# Patient Record
Sex: FEMALE | URBAN - METROPOLITAN AREA
[De-identification: names, ages, dates, MRNs, and addresses within clinical notes are randomized per-mention and may not be internally consistent; named-entity substitution may affect disease eponyms.]

---

## 2021-11-04 ENCOUNTER — OFFICE VISIT (OUTPATIENT)
Dept: URBAN - METROPOLITAN AREA CLINIC 91 | Facility: CLINIC | Age: 36
End: 2021-11-04

## 2021-11-04 DIAGNOSIS — H04.123 DRY EYE SYNDROME OF BILATERAL LACRIMAL GLANDS: ICD-10-CM

## 2021-11-04 DIAGNOSIS — H52.223 REGULAR ASTIGMATISM, BILATERAL: Primary | ICD-10-CM

## 2021-11-04 PROCEDURE — 92025 CPTRIZED CORNEAL TOPOGRAPHY: CPT | Performed by: SPECIALIST

## 2021-11-04 PROCEDURE — 99024 POSTOP FOLLOW-UP VISIT: CPT | Performed by: SPECIALIST

## 2021-11-04 NOTE — IMPRESSION/PLAN
Called left message to reschedule appointment   Impression: Regular astigmatism, bilateral: H52.223. Plan: Pentacam reviewed and explained stable OU. Encouraged patient to not wear CTL 1 "[employer] prior to LASIK EVAL. Discussed diagnosis in detail with patient. LASIK candidate. Patient elects to have surgery. Discussed risks/benefits of laser TX. Discussed the need for touch up, dry eyes and glare explained.

## 2021-12-01 ENCOUNTER — PROCEDURE (OUTPATIENT)
Dept: URBAN - METROPOLITAN AREA CLINIC 91 | Facility: CLINIC | Age: 36
End: 2021-12-01

## 2021-12-01 PROCEDURE — CVLSK CVLSK: CUSTOM | Performed by: SPECIALIST

## 2021-12-02 ENCOUNTER — POST-OPERATIVE VISIT (OUTPATIENT)
Dept: URBAN - METROPOLITAN AREA CLINIC 91 | Facility: CLINIC | Age: 36
End: 2021-12-02

## 2021-12-02 PROCEDURE — 99024 POSTOP FOLLOW-UP VISIT: CPT | Performed by: OPHTHALMOLOGY

## 2021-12-02 NOTE — IMPRESSION/PLAN
Impression: S/P LASIK - [facility]vue OU - . Encounter for surgical aftercare following surgery on a sense organ  Z48.810. Post operative instructions reviewed - Plan: Patient healing well status post LASIK. I have stressed the importance of patient not rubbing eye and avoiding swimming. Patient should wear protective eye shields while sleeping for 2 weeks. I went over post-op medication schedule with patient. Patient to continue with antibiotic drops and steroid drops 4-8 times per day x 10 days then discontinue. Patient should also begin using a good quality preservative-free artificial tear 6-8 times per day.  Lotemax OU TID + Oflox OU QID

## 2021-12-10 ENCOUNTER — POST-OPERATIVE VISIT (OUTPATIENT)
Dept: URBAN - METROPOLITAN AREA CLINIC 91 | Facility: CLINIC | Age: 36
End: 2021-12-10

## 2021-12-10 PROCEDURE — 99024 POSTOP FOLLOW-UP VISIT: CPT | Performed by: OPHTHALMOLOGY

## 2021-12-10 RX ORDER — PREDNISOLONE ACETATE 10 MG/ML
1 % SUSPENSION/ DROPS OPHTHALMIC
Qty: 3 | Refills: 0 | Status: INACTIVE
Start: 2021-12-10 | End: 2022-01-20

## 2021-12-10 ASSESSMENT — VISUAL ACUITY
OD: 20/20
OS: 20/60

## 2021-12-10 NOTE — IMPRESSION/PLAN
Impression: S/P LASIK - Customvue OU - 9 Days. Encounter for surgical aftercare following surgery on a sense organ  Z48.810. Plan: Patient healing well status post LASIK although there is moderate dryness and possible very subtle inflammation nasally OS . I have advised patient to avoid continue to swimming pools and to wear protective eye shields while sleeping for another week. Patient should continue post op meds until finished with full 10 day course OD, but recommended we change from lotemax to prednisolone 6x per day for the inflammation. I also recommend they continue with a good quality preservative-free artificial tear 6-8 times per day.

## 2021-12-16 ENCOUNTER — OFFICE VISIT (OUTPATIENT)
Dept: URBAN - METROPOLITAN AREA CLINIC 91 | Facility: CLINIC | Age: 36
End: 2021-12-16

## 2021-12-16 DIAGNOSIS — H16.223 BILATERAL KERATOCONJUNCTIVITIS SICCA, NOT SPECIFIED AS SJOGREN'S: ICD-10-CM

## 2021-12-16 DIAGNOSIS — Z48.810 ENCOUNTER FOR SURGICAL AFTERCARE FOLLOWING SURGERY ON A SENSE ORGAN: Primary | ICD-10-CM

## 2021-12-16 PROCEDURE — 92025 CPTRIZED CORNEAL TOPOGRAPHY: CPT | Performed by: SPECIALIST

## 2021-12-16 PROCEDURE — 99024 POSTOP FOLLOW-UP VISIT: CPT | Performed by: SPECIALIST

## 2021-12-16 ASSESSMENT — VISUAL ACUITY: OS: 20/40

## 2021-12-16 NOTE — IMPRESSION/PLAN
Impression: Bilateral keratoconjunctivitis sicca, not specified as Sjogren's: Y75.015.  Plan: see plan above

## 2021-12-16 NOTE — IMPRESSION/PLAN
Impression: Encounter for surgical aftercare following surgery on a sense organ: Z48.810 OU. Plan: Patient healing well post LASIK OU, no inflammation on exam and instructed patient to reduce Prednisolone to BID OS for 1 week. OU punctual plugs placed in at this time due to severe dry eye OU, consent obtained.  Instructed patient to use over the counter lubricating gtts in addition

## 2022-01-20 ENCOUNTER — OFFICE VISIT (OUTPATIENT)
Dept: URBAN - METROPOLITAN AREA CLINIC 91 | Facility: CLINIC | Age: 37
End: 2022-01-20

## 2022-01-20 PROCEDURE — 99024 POSTOP FOLLOW-UP VISIT: CPT | Performed by: SPECIALIST

## 2022-01-20 NOTE — IMPRESSION/PLAN
Impression: Encounter for surgical aftercare following surgery on a sense organ: Z48.810 OU. Plan: Patient healing well post LASIK 6 weeks OU. Encouraged regular AT's.